# Patient Record
Sex: FEMALE | Race: WHITE | NOT HISPANIC OR LATINO | Employment: STUDENT | URBAN - NONMETROPOLITAN AREA
[De-identification: names, ages, dates, MRNs, and addresses within clinical notes are randomized per-mention and may not be internally consistent; named-entity substitution may affect disease eponyms.]

---
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
## 2019-02-15 RX ORDER — METOPROLOL SUCCINATE 25 MG/1
TABLET, EXTENDED RELEASE ORAL
Qty: 15 TABLET | Refills: 0 | Status: SHIPPED | OUTPATIENT
Start: 2019-02-15 | End: 2019-03-27 | Stop reason: SDUPTHER

## 2019-03-14 RX ORDER — METOPROLOL SUCCINATE 25 MG/1
TABLET, EXTENDED RELEASE ORAL
Qty: 15 TABLET | Refills: 0 | OUTPATIENT
Start: 2019-03-14

## 2019-03-21 RX ORDER — METOPROLOL SUCCINATE 25 MG/1
TABLET, EXTENDED RELEASE ORAL
Qty: 15 TABLET | Refills: 0 | OUTPATIENT
Start: 2019-03-21

## 2019-03-27 RX ORDER — METOPROLOL SUCCINATE 25 MG/1
12.5 TABLET, EXTENDED RELEASE ORAL DAILY
Qty: 15 TABLET | Refills: 0 | Status: SHIPPED | OUTPATIENT
Start: 2019-03-27

## 2019-05-01 ENCOUNTER — TELEPHONE (OUTPATIENT)
Dept: CARDIOLOGY | Facility: CLINIC | Age: 19
End: 2019-05-01

## 2019-05-01 NOTE — TELEPHONE ENCOUNTER
PT HAD APPOINTMENT WITH [facility] ON 5-1-2019. UNABLE TO REACH PT TO CONFIRM APPOINTMENT ON 4-. PT NO SHOW AND UNABLE TO REACH PT TO R/S. WILL ATTEMPT CALL AGAIN

## 2023-10-25 ENCOUNTER — OFFICE VISIT (OUTPATIENT)
Dept: CARDIOLOGY | Facility: CLINIC | Age: 23
End: 2023-10-25
Payer: COMMERCIAL

## 2023-10-25 VITALS
WEIGHT: 231 LBS | OXYGEN SATURATION: 98 % | DIASTOLIC BLOOD PRESSURE: 86 MMHG | BODY MASS INDEX: 42.51 KG/M2 | HEIGHT: 62 IN | HEART RATE: 81 BPM | SYSTOLIC BLOOD PRESSURE: 120 MMHG

## 2023-10-25 DIAGNOSIS — R00.2 PALPITATIONS: Primary | ICD-10-CM

## 2023-10-25 DIAGNOSIS — R06.02 SHORTNESS OF BREATH: ICD-10-CM

## 2023-10-25 DIAGNOSIS — R42 DIZZINESS: ICD-10-CM

## 2023-10-25 DIAGNOSIS — R00.0 TACHYCARDIA: ICD-10-CM

## 2023-10-25 PROCEDURE — 99204 OFFICE O/P NEW MOD 45 MIN: CPT | Performed by: PHYSICIAN ASSISTANT

## 2023-10-25 PROCEDURE — 1159F MED LIST DOCD IN RCRD: CPT | Performed by: PHYSICIAN ASSISTANT

## 2023-10-25 PROCEDURE — 1160F RVW MEDS BY RX/DR IN RCRD: CPT | Performed by: PHYSICIAN ASSISTANT

## 2023-10-25 NOTE — PROGRESS NOTES
Subjective   Shahrzad Wan is a 23 y.o. female     Chief Complaint   Patient presents with    Establish Care     Cardiac eval     Dizziness    Rapid Heart Rate    Palpitations       HPI  The patient is a 23-year-old female who presents to reestablish cardiovascular care.  She was evaluated at least 7 years ago through the clinic for chest pain, dyspnea, and palpitations.  At that time, stress test and echo studies were performed.  Stress and echo studies were unremarkable.  Event monitor was performed as well which indicated sinus rhythm with sinus tachycardia but no significant dysrhythmic activity.  Rare PVCs were noted.  She was lost to follow-up for unknown reasons but presents today to [facility] because of recurrent symptoms as of recent.  She has started noticing increasing episodes of dizziness, typically associated with tachycardia and palpitations otherwise.  She really has had no syncopal episodes but has had intermittent episodes of presyncope.  She has been placed on metoprolol succinate, but she does not feel that this is overly effective.  She feels that the medication wears off during the day.  She otherwise rarely has chest pain.  Dyspnea remains at baseline.  She has no failure symptoms.  She did have an EKG with her primary care provider.  This suggested sinus rhythm with minor nonspecific ST and T wave changes.  There was also time pattern in V1 and V2.  No acute changes were noted.  Labs were also performed where CBC findings were unremarkable.  Chemistry profile was very much benign.  Thyroid studies were unremarkable as well.  With ongoing symptoms, cardiac consultation has been recommended and she presents today in that setting.      Current Outpatient Medications   Medication Sig Dispense Refill    metoprolol tartrate (LOPRESSOR) 25 MG tablet Take 0.5 tablets by mouth 2 (Two) Times a Day. 30 tablet 11     No current facility-administered medications for this visit.       Patient has no  "known allergies.    Past Medical History:   Diagnosis Date    Abnormal ECG 2004       Social History     Socioeconomic History    Marital status:    Tobacco Use    Smoking status: Never   Substance and Sexual Activity    Alcohol use: No    Drug use: No    Sexual activity: Yes     Partners: Male     Birth control/protection: Condom       Family History   Problem Relation Age of Onset    No Known Problems Mother     No Known Problems Father     Heart failure Paternal Grandmother     Hypertension Paternal Grandmother        Review of Systems   Constitutional:  Positive for fatigue. Negative for chills, diaphoresis and fever.   HENT:  Positive for sinus pain.    Eyes:  Positive for visual disturbance (floaters).   Respiratory: Negative.  Negative for apnea, cough, chest tightness, shortness of breath and wheezing.    Cardiovascular:  Positive for chest pain and palpitations. Negative for leg swelling.   Gastrointestinal: Negative.  Negative for abdominal pain and blood in stool.   Endocrine: Negative.  Negative for cold intolerance and heat intolerance.   Genitourinary: Negative.  Negative for hematuria.   Musculoskeletal:  Positive for neck pain. Negative for arthralgias, back pain, myalgias and neck stiffness.   Skin: Negative.  Negative for rash and wound.   Allergic/Immunologic: Negative.  Negative for environmental allergies and food allergies.   Neurological:  Positive for dizziness, weakness and light-headedness. Negative for syncope, numbness and headaches.   Hematological: Negative.  Does not bruise/bleed easily.   Psychiatric/Behavioral:  Positive for sleep disturbance.        Objective     Vitals:    10/25/23 1448   BP: 120/86   BP Location: Left arm   Patient Position: Sitting   Pulse: 81   SpO2: 98%   Weight: 105 kg (231 lb)   Height: 157.5 cm (62\")        /86 (BP Location: Left arm, Patient Position: Sitting)   Pulse 81   Ht 157.5 cm (62\")   Wt 105 kg (231 lb)   SpO2 98%   BMI 42.25 kg/m² "      Lab Results (most recent)       None            Physical Exam  Vitals and nursing note reviewed.   Constitutional:       General: She is not in acute distress.     Appearance: She is well-developed.   HENT:      Head: Normocephalic and atraumatic.   Eyes:      Conjunctiva/sclera: Conjunctivae normal.      Pupils: Pupils are equal, round, and reactive to light.   Neck:      Vascular: No JVD.      Trachea: No tracheal deviation.   Cardiovascular:      Rate and Rhythm: Normal rate and regular rhythm.      Heart sounds: Normal heart sounds.   Pulmonary:      Effort: Pulmonary effort is normal.      Breath sounds: Normal breath sounds.   Abdominal:      General: Bowel sounds are normal. There is no distension.      Palpations: Abdomen is soft. There is no mass.      Tenderness: There is no abdominal tenderness. There is no guarding or rebound.   Musculoskeletal:         General: No tenderness or deformity. Normal range of motion.      Cervical back: Normal range of motion and neck supple.   Skin:     General: Skin is warm and dry.      Coloration: Skin is not pale.      Findings: No erythema or rash.   Neurological:      Mental Status: She is alert and oriented to person, place, and time.   Psychiatric:         Behavior: Behavior normal.         Thought Content: Thought content normal.         Judgment: Judgment normal.       Procedure   Procedures         Assessment & Plan      Diagnosis Plan   1. Palpitations  Adult Transthoracic Echo Complete W/ Cont if Necessary Per Protocol    Holter Monitor - 72 Hour Up To 15 Days      2. Tachycardia  Adult Transthoracic Echo Complete W/ Cont if Necessary Per Protocol    Holter Monitor - 72 Hour Up To 15 Days      3. Shortness of breath  Adult Transthoracic Echo Complete W/ Cont if Necessary Per Protocol    Holter Monitor - 72 Hour Up To 15 Days      4. Dizziness  Adult Transthoracic Echo Complete W/ Cont if Necessary Per Protocol    Holter Monitor - 72 Hour Up To 15 Days         1.  The patient presents into the clinic today to reestablish cardiovascular care.  She is primarily referred because of episodes of dizziness with associated palpitations and tachycardia.  I would like to schedule the patient for Holter monitor to evaluate for rate or rhythm disturbance issues.    2.  We will also schedule for an echocardiogram because of symptoms and clinical scenario as above.    3.  She has been placed on metoprolol succinate.  She feels that symptoms may be improved, but feels that the long-acting therapy does not adequately treat throughout the day.  I would try to change that at this time.  We will change her to metoprolol tartrate at 25 mg twice a day.  We can titrate that as needed.    4.  Recent laboratories and EKG were unremarkable.  We have reviewed this in detail with her.    5.  As soon as we know study results as above we will recommend further.  She will report back symptoms with change in therapy.  Further pending above.                 Electronically signed by:

## 2023-11-02 ENCOUNTER — TELEPHONE (OUTPATIENT)
Dept: CARDIOLOGY | Facility: CLINIC | Age: 23
End: 2023-11-02
Payer: COMMERCIAL

## 2023-11-02 NOTE — TELEPHONE ENCOUNTER
Per bon hanson metoprolol 25mg  bid, start out with 1/2 tablet bid if symptoms persist we might increase to 1 tab bid at a later time.     Rx submitted to pharmacy, patient notified.

## 2023-11-02 NOTE — TELEPHONE ENCOUNTER
----- Message from Genesis Heath MA sent at 10/30/2023  5:12 PM EDT -----  Regarding: FW: Medicine  Contact: [phone]    ----- Message -----  From: Shahrzad Wan  Sent: 10/30/2023   2:07 PM EDT  To: Saúl Melara [facility]  Subject: Medicine                                         I was seen by you on Wednesday the 25th and we talked about getting a prescription changed and I went to my pharmacy and they said they didn’t have any new prescription. I was just wondering if you had it sent it in?

## 2023-11-22 ENCOUNTER — CLINICAL SUPPORT (OUTPATIENT)
Dept: CARDIOLOGY | Facility: CLINIC | Age: 23
End: 2023-11-22
Payer: COMMERCIAL

## 2023-11-22 VITALS
BODY MASS INDEX: 43.06 KG/M2 | HEART RATE: 73 BPM | WEIGHT: 234 LBS | OXYGEN SATURATION: 98 % | DIASTOLIC BLOOD PRESSURE: 80 MMHG | HEIGHT: 62 IN | SYSTOLIC BLOOD PRESSURE: 113 MMHG

## 2023-11-22 NOTE — PROGRESS NOTES
Shahrzad Wan  2000 11/22/2023   ?   No chief complaint on file.     ?   HPI: Patient presented in office today for symptom check due to palpitations. She states that she still feels fluttering, that still remains about the same as last time being seen. She states that she feels as if the medication wears off pretty quick. It looks like last time, kale wanted to increase metoprolol 25 mg bid, and she is currently still taking metoprolol 12.5 mg bid. She denies any chest pain or other symptoms at this time.        ?   ? 1.  The patient presents into the clinic today to reestablish cardiovascular care.  She is primarily referred because of episodes of dizziness with associated palpitations and tachycardia.  I would like to schedule the patient for Holter monitor to evaluate for rate or rhythm disturbance issues.     2.  We will also schedule for an echocardiogram because of symptoms and clinical scenario as above.     3.  She has been placed on metoprolol succinate.  She feels that symptoms may be improved, but feels that the long-acting therapy does not adequately treat throughout the day.  I would try to change that at this time.  We will change her to metoprolol tartrate at 25 mg twice a day.  We can titrate that as needed.     4.  Recent laboratories and EKG were unremarkable.  We have reviewed this in detail with her.     5.  As soon as we know study results as above we will recommend further.  She will report back symptoms with change in therapy.  Further pending above.  ?     Current Outpatient Medications:     metoprolol tartrate (LOPRESSOR) 25 MG tablet, Take 0.5 tablets by mouth 2 (Two) Times a Day., Disp: 30 tablet, Rfl: 11   ?   ?   Patient has no known allergies.         ECG 12 Lead    Date/Time: 11/22/2023 3:14 PM  Performed by: Kale Mello PA    Authorized by: Kale Mello PA           ?   Assessment & Plan  Per EULALIO Matta  1)We will increase metoprolol to 25 mg bid, if BP will  allow.  2)NV in x1 month after being on increased medication dose, for symptom check. Monitor in the meantime.    Patient verbally understands recommendations.    Genesis Heath MA    ?   ?   ?

## 2023-12-06 ENCOUNTER — HOSPITAL ENCOUNTER (OUTPATIENT)
Dept: CARDIOLOGY | Facility: HOSPITAL | Age: 23
Discharge: HOME | End: 2023-12-06
Payer: COMMERCIAL

## 2023-12-06 DIAGNOSIS — R06.02 SHORTNESS OF BREATH: ICD-10-CM

## 2023-12-06 DIAGNOSIS — R00.2 PALPITATIONS: ICD-10-CM

## 2023-12-06 DIAGNOSIS — R42 DIZZINESS: ICD-10-CM

## 2023-12-06 DIAGNOSIS — R00.0 TACHYCARDIA: ICD-10-CM

## 2023-12-06 LAB
BH CV ECHO MEAS - ACS: 2.1 CM
BH CV ECHO MEAS - AO MAX PG: 6.3 MMHG
BH CV ECHO MEAS - AO MEAN PG: 4 MMHG
BH CV ECHO MEAS - AO ROOT DIAM: 3 CM
BH CV ECHO MEAS - AO V2 MAX: 125 CM/SEC
BH CV ECHO MEAS - AO V2 VTI: 22.9 CM
BH CV ECHO MEAS - EDV(CUBED): 134.2 ML
BH CV ECHO MEAS - EDV(MOD-SP4): 91.1 ML
BH CV ECHO MEAS - EF(MOD-SP4): 73.8 %
BH CV ECHO MEAS - ESV(CUBED): 29.4 ML
BH CV ECHO MEAS - ESV(MOD-SP4): 23.9 ML
BH CV ECHO MEAS - FS: 39.7 %
BH CV ECHO MEAS - IVS/LVPW: 0.99 CM
BH CV ECHO MEAS - IVSD: 0.75 CM
BH CV ECHO MEAS - LA DIMENSION: 2.7 CM
BH CV ECHO MEAS - LAT PEAK E' VEL: 15.3 CM/SEC
BH CV ECHO MEAS - LV DIASTOLIC VOL/BSA (35-75): 44.6 CM2
BH CV ECHO MEAS - LV MASS(C)D: 132 GRAMS
BH CV ECHO MEAS - LV SYSTOLIC VOL/BSA (12-30): 11.7 CM2
BH CV ECHO MEAS - LVIDD: 5.1 CM
BH CV ECHO MEAS - LVIDS: 3.1 CM
BH CV ECHO MEAS - LVOT AREA: 3.5 CM2
BH CV ECHO MEAS - LVOT DIAM: 2.1 CM
BH CV ECHO MEAS - LVPWD: 0.76 CM
BH CV ECHO MEAS - MED PEAK E' VEL: 10.1 CM/SEC
BH CV ECHO MEAS - MV A MAX VEL: 71 CM/SEC
BH CV ECHO MEAS - MV E MAX VEL: 110 CM/SEC
BH CV ECHO MEAS - MV E/A: 1.55
BH CV ECHO MEAS - PA ACC TIME: 0.16 SEC
BH CV ECHO MEAS - SI(MOD-SP4): 32.9 ML/M2
BH CV ECHO MEAS - SV(MOD-SP4): 67.2 ML
BH CV ECHO MEAS - TAPSE (>1.6): 2.8 CM
BH CV ECHO MEASUREMENTS AVERAGE E/E' RATIO: 8.66
LEFT ATRIUM VOLUME INDEX: 13.3 ML/M2

## 2023-12-06 PROCEDURE — 93306 TTE W/DOPPLER COMPLETE: CPT

## 2023-12-19 ENCOUNTER — TELEPHONE (OUTPATIENT)
Dept: CARDIOLOGY | Facility: CLINIC | Age: 23
End: 2023-12-19
Payer: COMMERCIAL

## 2023-12-19 NOTE — TELEPHONE ENCOUNTER
Notified pt of TANG Matta recommendations. Pt verbalizes understanding and is aware to continue to monitor BP, heart rate and for any symptoms and any worsening symptoms to go to the ER.      Kale Mello PA Welch, Melissa, LPN  Caller: Unspecified (Today,  8:30 AM)  37.5 mg 1 p.o. twice daily.

## 2023-12-19 NOTE — TELEPHONE ENCOUNTER
Notified pt of her results and TANG Granado, recommendations. Pt states that most of the time symptoms are better but still having moments with the palpitations, dizziness and chest pain and they will come and go.  Pt denies any chest pain currently. I did instruct patient that if she gets any worsening symptoms to go to the ER in the meantime.        Faxed results to pcp      ----- Message from Flor Martell MA sent at 12/18/2023  9:27 AM EST -----    ----- Message -----  From: Kale Mello PA  Sent: 12/16/2023   4:32 PM EST  To: Flor Martell MA    Symptoms mostly occur during sinus tachycardia and PVCs.  Have symptoms improve with medication change?  ----- Message -----  From: Darryl Lara MD  Sent: 12/14/2023   8:20 PM EST  To: TANG Sky      Monitor results    Result Text  1.  Sinus rhythm as the baseline and predominant rhythm throughout the study.  2.  Rare PACs and PVCs were present.  3.  No other arrhythmia, ectopy, or block of significance was noted.  4.  A total of 19 triggers were noted during the study correlating to symptoms.  There were 18 total diary entries as well.  Most symptoms occur during sinus rhythm, frequently sinus tachycardia.  PVCs were frequently sensed as well.         echo results     Pt is aware of results and Kale Mello's recommendations. Pt verbalizes ----- Message from Flor Martell MA sent at 12/18/2023  9:27 AM EST -----    ----- Message -----  From: Kale Mello PA  Sent: 12/16/2023   4:32 PM EST  To: Flor Martell MA    Routine follow-up.  ----- Message -----  From: Darryl Lara MD  Sent: 12/13/2023  10:09 PM EST  To: TANG Sky    Echo results    Result Text  Technically adequate study.     1.  LV size is at the upper limits of normal and global LV systolic function is preserved.  Visually estimated ejection fraction is 50 to 55%.  LV wall thickness, although measured as at the lower limits of normal is actually at the upper  limits of normal to mildly increased.  Number, LV diastolic function and filling pressures are normal.  No focal wall motion abnormalities.  The left atrium is at the upper limits of normal size.  Right heart chambers are grossly normal.  No septal defect or intracavitary mass or thrombus.     2.  Somewhat limited echo and Doppler sampling fails to demonstrate significant morphologic or physiologic valve abnormalities.     3.  No pericardial or great vessel pathology.     4.  RV and PA systolic pressures cannot be calculated.

## 2023-12-19 NOTE — TELEPHONE ENCOUNTER
----- Message from Flor Martell MA sent at 12/18/2023  9:27 AM EST -----    ----- Message -----  From: Kale Mello PA  Sent: 12/16/2023   4:32 PM EST  To: Flor Martell MA    Routine follow-up.  ----- Message -----  From: Darryl Lara MD  Sent: 12/13/2023  10:09 PM EST  To: TANG Sky

## 2024-01-03 ENCOUNTER — CLINICAL SUPPORT (OUTPATIENT)
Dept: CARDIOLOGY | Facility: CLINIC | Age: 24
End: 2024-01-03
Payer: COMMERCIAL

## 2024-01-03 VITALS — OXYGEN SATURATION: 98 % | DIASTOLIC BLOOD PRESSURE: 73 MMHG | HEART RATE: 74 BPM | SYSTOLIC BLOOD PRESSURE: 107 MMHG

## 2024-01-03 DIAGNOSIS — R00.2 PALPITATIONS: Primary | ICD-10-CM

## 2024-01-03 RX ORDER — BISOPROLOL FUMARATE 5 MG/1
TABLET, FILM COATED ORAL
Qty: 30 TABLET | Refills: 1 | Status: SHIPPED | OUTPATIENT
Start: 2024-01-03

## 2024-01-03 NOTE — PROGRESS NOTES
Shahrzad Wan  2000  1/3/2024   ?   Chief Complaint   Patient presents with    Nurse Visit      Symptom check       ?   HPI:   ?   ? patient presented to the office today for a NV she was recently started on Metoprolol tart. 25 mg 1 daily to help with papillations and heart racing it was increased to 1.5 tablets daily due to her feeling increased palpitations and dizziness  with position change she states today she states today she went back down to the 1 tablet daily due to symptoms getting worse on the 1.5 tablet. She states she has had some chest pain at random times also.   ?     Current Outpatient Medications:     bisoprolol (ZEBeta) 5 MG tablet, Start out with 1/2 tablet daily, Disp: 30 tablet, Rfl: 1   ?   ?   Patient has no known allergies.         ECG 12 Lead    Date/Time: 1/3/2024 3:02 PM  Performed by: Kale Mello PA    Authorized by: Kale Mello PA  Comparison: compared with previous ECG from 8/9/2023           ?   Assessment & Plan    ?   ?   ? palpitations     Chart , EKG , Medication reviewed by Kale Mello PAC     Patient is to D/C Metoprolol Tart. 25 mg due to increased side effects.  Patient is to start Bisoprolol 5 mg she will start out with 1/2 tablet ( 2.5 mg ) daily.  Patient is to call in 2-3 weeks with symptom update at that time we may look at increasingly to full 5 mg tablet if she has improvement.   Patient to call with any question or concerns or any new or worsening symptoms after starting new medication.     Patient Verbalized Understanding.     AT [facility]

## 2024-04-30 ENCOUNTER — TELEPHONE (OUTPATIENT)
Dept: CARDIOLOGY | Facility: CLINIC | Age: 24
End: 2024-04-30
Payer: COMMERCIAL

## 2024-04-30 RX ORDER — BISOPROLOL FUMARATE 5 MG/1
5 TABLET, FILM COATED ORAL DAILY
Qty: 30 TABLET | Refills: 6 | Status: SHIPPED | OUTPATIENT
Start: 2024-04-30

## 2024-04-30 RX ORDER — BISOPROLOL FUMARATE 5 MG/1
TABLET, FILM COATED ORAL
Qty: 30 TABLET | Refills: 0 | Status: SHIPPED | OUTPATIENT
Start: 2024-04-30 | End: 2024-04-30 | Stop reason: SDUPTHER

## 2024-04-30 NOTE — TELEPHONE ENCOUNTER
Per Kale Mello PA-C, patient can take full tablet if blood pressure will allow. She does not have BP readings but does not recall it running low. She was advised to monitor BP/HR after taking a full tablet and call with any concerns.

## 2024-04-30 NOTE — TELEPHONE ENCOUNTER
Patient called in said that she has been taking bisoprolol 5 mg 1/2 tab po qd . She said that this was working well at first but that now her heart rate is getting up . She would like to know if she should take a whole tablet daily .

## 2024-07-01 RX ORDER — BISOPROLOL FUMARATE 5 MG/1
TABLET, FILM COATED ORAL
Qty: 30 TABLET | Refills: 0 | Status: SHIPPED | OUTPATIENT
Start: 2024-07-01

## 2024-08-12 RX ORDER — BISOPROLOL FUMARATE 5 MG/1
TABLET, FILM COATED ORAL
Qty: 30 TABLET | Refills: 0 | Status: SHIPPED | OUTPATIENT
Start: 2024-08-12

## 2024-11-01 RX ORDER — BISOPROLOL FUMARATE 5 MG/1
2.5 TABLET, FILM COATED ORAL DAILY
Qty: 30 TABLET | Refills: 0 | Status: SHIPPED | OUTPATIENT
Start: 2024-11-01

## 2024-11-21 ENCOUNTER — OFFICE VISIT (OUTPATIENT)
Dept: CARDIOLOGY | Facility: CLINIC | Age: 24
End: 2024-11-21
Payer: COMMERCIAL

## 2024-11-21 VITALS
HEART RATE: 74 BPM | DIASTOLIC BLOOD PRESSURE: 72 MMHG | SYSTOLIC BLOOD PRESSURE: 116 MMHG | WEIGHT: 247.6 LBS | HEIGHT: 62 IN | OXYGEN SATURATION: 97 % | BODY MASS INDEX: 45.56 KG/M2

## 2024-11-21 DIAGNOSIS — R06.02 SHORTNESS OF BREATH: ICD-10-CM

## 2024-11-21 DIAGNOSIS — R00.0 TACHYCARDIA: ICD-10-CM

## 2024-11-21 DIAGNOSIS — R00.2 PALPITATIONS: Primary | ICD-10-CM

## 2024-11-21 PROCEDURE — 1159F MED LIST DOCD IN RCRD: CPT | Performed by: PHYSICIAN ASSISTANT

## 2024-11-21 PROCEDURE — 1160F RVW MEDS BY RX/DR IN RCRD: CPT | Performed by: PHYSICIAN ASSISTANT

## 2024-11-21 PROCEDURE — 99213 OFFICE O/P EST LOW 20 MIN: CPT | Performed by: PHYSICIAN ASSISTANT

## 2024-11-21 NOTE — PROGRESS NOTES
Problem list     Subjective   Shahrzad Wan is a 24 y.o. female     Chief Complaint   Patient presents with    Palpitations     Routine follow up       HPI  The patient presents in clinic today for follow-up and yearly evaluation.  She has been seen for some time to the clinic for chronic and recurrent chest pain, palpitations, and dyspnea.  Prior stress and echo studies a number of years ago through the clinic had been benign.  She was lost to follow-up but presented back last year because of ongoing symptoms, primarily palpitations and tachycardia.  Echo was performed again indicating normal systolic function with no significant valvular or structural abnormalities.  Monitor indicated sinus rhythm with rare PACs and PVCs.  There is no significant rate or rhythm disturbance issues otherwise.  Of note, symptoms mostly occurred during sinus rhythm but PVCs were sensed.  She was started on metoprolol initially.  This was not felt to be overly effective.  Will start back and changed to bisoprolol.  She has done extraordinarily well with bisoprolol.  She tells me that her palpitations and tachycardic issues have basically resolved on this therapy.  She denies continued chest pain.  Dyspnea is at baseline.  She feels very well at this time and has no further complaints.    Current Outpatient Medications on File Prior to Visit   Medication Sig Dispense Refill    bisoprolol (ZEBeta) 5 MG tablet Take 1/2 (one-half) tablet by mouth once daily 30 tablet 0     No current facility-administered medications on file prior to visit.       Patient has no known allergies.    Past Medical History:   Diagnosis Date    Abnormal ECG 2004       Social History     Socioeconomic History    Marital status:    Tobacco Use    Smoking status: Never   Substance and Sexual Activity    Alcohol use: No    Drug use: No    Sexual activity: Yes     Partners: Male     Birth control/protection: Condom       Family History   Problem Relation Age  "of Onset    No Known Problems Mother     No Known Problems Father     Heart failure Paternal Grandmother     Hypertension Paternal Grandmother        Review of Systems   Constitutional: Negative.  Negative for chills, diaphoresis, fatigue and fever.   HENT: Negative.     Eyes: Negative.    Respiratory:  Positive for shortness of breath (increased activity or exertion). Negative for apnea, cough, chest tightness and wheezing.    Cardiovascular:  Positive for palpitations (improvement with bisoprolol). Negative for chest pain and leg swelling.   Gastrointestinal: Negative.  Negative for abdominal pain, blood in stool, constipation, diarrhea, nausea and vomiting.   Endocrine: Negative.    Genitourinary: Negative.  Negative for hematuria.   Musculoskeletal:  Positive for neck pain. Negative for arthralgias, back pain and myalgias.   Skin: Negative.    Allergic/Immunologic: Negative.    Neurological:  Positive for dizziness (random episodes). Negative for syncope, weakness, light-headedness, numbness and headaches.   Hematological: Negative.  Does not bruise/bleed easily.   Psychiatric/Behavioral: Negative.  Negative for sleep disturbance.        Objective   Vitals:    11/21/24 1400   BP: 116/72   BP Location: Left arm   Patient Position: Sitting   Pulse: 74   SpO2: 97%   Weight: 112 kg (247 lb 9.6 oz)   Height: 157.5 cm (62.01\")      /72 (BP Location: Left arm, Patient Position: Sitting)   Pulse 74   Ht 157.5 cm (62.01\")   Wt 112 kg (247 lb 9.6 oz)   SpO2 97%   BMI 45.27 kg/m²    Lab Results (most recent)       None          Physical Exam  Vitals and nursing note reviewed.   Constitutional:       General: She is not in acute distress.     Appearance: She is well-developed.   HENT:      Head: Normocephalic and atraumatic.   Eyes:      Conjunctiva/sclera: Conjunctivae normal.      Pupils: Pupils are equal, round, and reactive to light.   Neck:      Vascular: No JVD.      Trachea: No tracheal deviation. "   Cardiovascular:      Rate and Rhythm: Normal rate and regular rhythm.      Heart sounds: Normal heart sounds.   Pulmonary:      Effort: Pulmonary effort is normal.      Breath sounds: Normal breath sounds.   Abdominal:      General: Bowel sounds are normal. There is no distension.      Palpations: Abdomen is soft. There is no mass.      Tenderness: There is no abdominal tenderness. There is no guarding or rebound.   Musculoskeletal:         General: No tenderness or deformity. Normal range of motion.      Cervical back: Normal range of motion and neck supple.   Skin:     General: Skin is warm and dry.      Coloration: Skin is not pale.      Findings: No erythema or rash.   Neurological:      Mental Status: She is alert and oriented to person, place, and time.   Psychiatric:         Behavior: Behavior normal.         Thought Content: Thought content normal.         Judgment: Judgment normal.           Procedure   Procedures       Assessment & Plan      Diagnosis Plan   1. Palpitations        2. Tachycardia        3. Shortness of breath          1.  At this time, the patient appears to be doing well.  Prior echo and Holter monitor findings were reviewed in detail with the patient.  Echo findings were largely benign.  Holter indicated sinus rhythm as a baseline and predominant rhythm.  Rare PACs and PVCs were noted.  Symptoms were noted with PVCs.    2.  She was initially treated with metoprolol, but does not feel this as being overly effective.  This was discontinued and she was placed on bisoprolol.  She has responded very favorably to the bisoprolol.  Tachycardic and palpitation type issues have now resolved.  She has no dysrhythmic symptoms otherwise.  With institution of the medications she also tells me that her chest pain and other symptoms have resolved.    3.  With benign workup now complete stability on low-dose beta-blocker therapy, nothing further.  We will continue to see her on a yearly  evaluation.           Shahrzad Wan  reports that she has never smoked. She does not have any smokeless tobacco history on file.            Electronically signed by:

## 2024-12-30 RX ORDER — BISOPROLOL FUMARATE 5 MG/1
2.5 TABLET, FILM COATED ORAL DAILY
Qty: 30 TABLET | Refills: 0 | Status: SHIPPED | OUTPATIENT
Start: 2024-12-30

## 2024-12-30 RX ORDER — BISOPROLOL FUMARATE 5 MG/1
2.5 TABLET, FILM COATED ORAL DAILY
Qty: 30 TABLET | Refills: 0 | Status: SHIPPED | OUTPATIENT
Start: 2024-12-30 | End: 2024-12-30

## 2025-01-21 ENCOUNTER — TELEPHONE (OUTPATIENT)
Dept: CARDIOLOGY | Facility: CLINIC | Age: 25
End: 2025-01-21
Payer: COMMERCIAL

## 2025-01-22 NOTE — TELEPHONE ENCOUNTER
Patient notified    Kale Mello PA Hedrick, Mari-Alice, MA  Caller: Unspecified (Yesterday,  1:28 PM)  Please establish with OB service.  I would await their recommendation on this medication.  From our standpoint, the medication is pregnancy category C.  I would be fine with that as long as her OB service is.

## 2025-05-07 RX ORDER — BISOPROLOL FUMARATE 5 MG/1
2.5 TABLET, FILM COATED ORAL DAILY
Qty: 30 TABLET | Refills: 0 | Status: SHIPPED | OUTPATIENT
Start: 2025-05-07

## 2025-07-01 RX ORDER — BISOPROLOL FUMARATE 5 MG/1
2.5 TABLET, FILM COATED ORAL DAILY
Qty: 45 TABLET | Refills: 3 | Status: SHIPPED | OUTPATIENT
Start: 2025-07-01

## 2025-07-24 ENCOUNTER — TELEPHONE (OUTPATIENT)
Dept: CARDIOLOGY | Facility: CLINIC | Age: 25
End: 2025-07-24

## 2025-07-28 ENCOUNTER — OFFICE VISIT (OUTPATIENT)
Dept: CARDIOLOGY | Facility: CLINIC | Age: 25
End: 2025-07-28
Payer: COMMERCIAL

## 2025-07-28 VITALS
OXYGEN SATURATION: 96 % | HEART RATE: 92 BPM | HEIGHT: 62 IN | BODY MASS INDEX: 38.64 KG/M2 | DIASTOLIC BLOOD PRESSURE: 74 MMHG | WEIGHT: 210 LBS | SYSTOLIC BLOOD PRESSURE: 108 MMHG

## 2025-07-28 DIAGNOSIS — R00.2 PALPITATIONS: Primary | ICD-10-CM

## 2025-07-28 DIAGNOSIS — R00.0 TACHYCARDIA: ICD-10-CM

## 2025-07-28 DIAGNOSIS — R06.02 SHORTNESS OF BREATH: ICD-10-CM

## 2025-07-28 DIAGNOSIS — R42 DIZZINESS: ICD-10-CM

## 2025-07-28 PROCEDURE — 99214 OFFICE O/P EST MOD 30 MIN: CPT | Performed by: PHYSICIAN ASSISTANT

## 2025-07-28 PROCEDURE — 1159F MED LIST DOCD IN RCRD: CPT | Performed by: PHYSICIAN ASSISTANT

## 2025-07-28 PROCEDURE — 93000 ELECTROCARDIOGRAM COMPLETE: CPT | Performed by: PHYSICIAN ASSISTANT

## 2025-07-28 PROCEDURE — 1160F RVW MEDS BY RX/DR IN RCRD: CPT | Performed by: PHYSICIAN ASSISTANT

## 2025-07-28 NOTE — PROGRESS NOTES
Problem list     Subjective   Shahrzad Wan is a 24 y.o. female     Chief Complaint   Patient presents with    Follow-up     Per PCP request for tachycardia and palpitations        HPI  The patient presents in the clinic today for follow-up.  We have seen her because of chronic palpitations and tachycardia.  She has also had chronic chest pain.  She had an evaluation with stress and echo studies previously.  Stress and echo studies have been benign, dating back to 2022 2023.  Monitor had suggested sinus rhythm with rare PACs and PVCs.  There was no significant rate or rhythm disturbance issues otherwise.  She initially was treated with metoprolol.  She felt that this was minimally effective.  That was discontinued in favor of bisoprolol.  She had responded exceptionally well to that.  She is referred back by her OB/GYN service because of increasing tachycardia and palpitations recently.  She has increasing positional dizziness.  Fatigue is significant.  She is very concerned with increasing palpitations and tachycardia and has requested repeat evaluation.  She reports that until pregnancy, bisoprolol had done an excellent job in minimizing symptoms.  She has no syncope.  She has no further complaints.    Current Outpatient Medications on File Prior to Visit   Medication Sig Dispense Refill    bisoprolol (ZEBeta) 5 MG tablet Take 1/2 (one-half) tablet by mouth once daily 45 tablet 3    Prenatal Vit-Fe Fumarate-FA (PRENATAL VITAMINS PO) Take  by mouth. daily       No current facility-administered medications on file prior to visit.       Patient has no known allergies.    Past Medical History:   Diagnosis Date    Abnormal ECG 2004       Social History     Socioeconomic History    Marital status:    Tobacco Use    Smoking status: Never    Smokeless tobacco: Never   Vaping Use    Vaping status: Never Used   Substance and Sexual Activity    Alcohol use: Never    Drug use: Never    Sexual activity: Yes      "Partners: Male     Birth control/protection: Condom       Family History   Problem Relation Age of Onset    No Known Problems Mother     No Known Problems Father     Heart failure Paternal Grandmother     Hypertension Paternal Grandmother        Review of Systems   Constitutional:  Positive for fatigue. Negative for chills, diaphoresis and fever.   HENT: Negative.  Negative for hearing loss.    Eyes: Negative.  Negative for visual disturbance.   Respiratory:  Positive for shortness of breath. Negative for apnea, cough, chest tightness and wheezing.    Cardiovascular:  Positive for palpitations. Negative for chest pain and leg swelling.   Gastrointestinal: Negative.  Negative for abdominal pain and blood in stool.   Endocrine: Negative.    Genitourinary: Negative.  Negative for hematuria.   Musculoskeletal: Negative.  Negative for arthralgias, back pain, myalgias, neck pain and neck stiffness.   Skin: Negative.  Negative for rash and wound.   Allergic/Immunologic: Negative.  Negative for environmental allergies and food allergies.   Neurological:  Positive for dizziness. Negative for syncope, weakness, light-headedness, numbness and headaches.   Hematological: Negative.  Does not bruise/bleed easily.   Psychiatric/Behavioral: Negative.  Negative for sleep disturbance.        Objective   Vitals:    07/28/25 0909   BP: 108/74   Pulse: 92   SpO2: 96%   Weight: 95.3 kg (210 lb)   Height: 157.5 cm (62\")      /74   Pulse 92   Ht 157.5 cm (62\")   Wt 95.3 kg (210 lb)   SpO2 96%   BMI 38.41 kg/m²    Lab Results (most recent)       None          Physical Exam  Vitals and nursing note reviewed.   Constitutional:       General: She is not in acute distress.     Appearance: She is well-developed.   HENT:      Head: Normocephalic and atraumatic.   Eyes:      Conjunctiva/sclera: Conjunctivae normal.      Pupils: Pupils are equal, round, and reactive to light.   Neck:      Vascular: No JVD.      Trachea: No tracheal " deviation.   Cardiovascular:      Rate and Rhythm: Normal rate and regular rhythm.      Heart sounds: Normal heart sounds.   Pulmonary:      Effort: Pulmonary effort is normal.      Breath sounds: Normal breath sounds.   Abdominal:      General: Bowel sounds are normal. There is no distension.      Palpations: Abdomen is soft. There is no mass.      Tenderness: There is no abdominal tenderness. There is no guarding or rebound.   Musculoskeletal:         General: No tenderness or deformity. Normal range of motion.      Cervical back: Normal range of motion and neck supple.   Skin:     General: Skin is warm and dry.      Coloration: Skin is not pale.      Findings: No erythema or rash.   Neurological:      Mental Status: She is alert and oriented to person, place, and time.   Psychiatric:         Behavior: Behavior normal.         Thought Content: Thought content normal.         Judgment: Judgment normal.           Procedure     ECG 12 Lead    Date/Time: 7/28/2025 9:12 AM  Performed by: Kale Mello PA    Authorized by: Kale Mello PA  Comparison: compared with previous ECG from 1/3/2024  Comparison to previous ECG: Sinus rhythm, rate 93, normal axis, minor nonspecific ST and T wave changes, no acute changes noted.             Assessment & Plan      Diagnosis Plan   1. Palpitations  Holter Monitor - 72 Hour Up To 15 Days    Adult Transthoracic Echo Complete W/ Cont if Necessary Per Protocol      2. Tachycardia  Holter Monitor - 72 Hour Up To 15 Days    Adult Transthoracic Echo Complete W/ Cont if Necessary Per Protocol      3. Shortness of breath  Holter Monitor - 72 Hour Up To 15 Days    Adult Transthoracic Echo Complete W/ Cont if Necessary Per Protocol      4. Dizziness  Holter Monitor - 72 Hour Up To 15 Days    Adult Transthoracic Echo Complete W/ Cont if Necessary Per Protocol        1.  At this time, the patient presents back because of increasing palpitations and tachycardia.  She is 32 weeks  pregnant which may be contributing.  Still, historically bisoprolol has been very effective at treating dysrhythmic symptoms.  I would continue that for now, as was cleared by her OB/GYN service.    2.  Because of change in clinical scenario, we will schedule for 72-hour Holter to reevaluate for rate or rhythm disturbance issues.  I want a get an echo as well to ensure no change in cardiac structure.  We can specifically evaluate systolic function.    3.  No further adjustments in medications will be made for now unless symptoms should worsen or we see something concerning on testing.  For worsening symptoms she will return.  Further pending above.           Shahrzad Wan  reports that she has never smoked. She has never used smokeless tobacco.         Electronically signed by:

## 2025-08-08 ENCOUNTER — HOSPITAL ENCOUNTER (OUTPATIENT)
Dept: CARDIOLOGY | Facility: HOSPITAL | Age: 25
Discharge: HOME | End: 2025-08-08
Payer: COMMERCIAL

## 2025-08-08 VITALS — BODY MASS INDEX: 38.66 KG/M2 | WEIGHT: 210.1 LBS | HEIGHT: 62 IN

## 2025-08-08 DIAGNOSIS — R00.2 PALPITATIONS: ICD-10-CM

## 2025-08-08 DIAGNOSIS — R00.0 TACHYCARDIA: ICD-10-CM

## 2025-08-08 DIAGNOSIS — R06.02 SHORTNESS OF BREATH: ICD-10-CM

## 2025-08-08 DIAGNOSIS — R42 DIZZINESS: ICD-10-CM

## 2025-08-08 LAB
AORTIC DIMENSIONLESS INDEX: 0.74 (DI)
AV MEAN PRESS GRAD SYS DOP V1V2: 2.6 MMHG
AV VMAX SYS DOP: 104.3 CM/SEC
BH CV ECHO MEAS - 2D AUTO EF SIEMENS: 60 %
BH CV ECHO MEAS - ACS: 1.9 CM
BH CV ECHO MEAS - AO MAX PG: 4.4 MMHG
BH CV ECHO MEAS - AO ROOT DIAM: 3 CM
BH CV ECHO MEAS - AO V2 VTI: 22.5 CM
BH CV ECHO MEAS - EDV(CUBED): 95.6 ML
BH CV ECHO MEAS - ESV(CUBED): 36.8 ML
BH CV ECHO MEAS - FS: 27.2 %
BH CV ECHO MEAS - IVS/LVPW: 0.91 CM
BH CV ECHO MEAS - IVSD: 0.88 CM
BH CV ECHO MEAS - LA DIMENSION: 3.3 CM
BH CV ECHO MEAS - LAT PEAK E' VEL: 10.6 CM/SEC
BH CV ECHO MEAS - LV MASS(C)D: 139.8 GRAMS
BH CV ECHO MEAS - LV MAX PG: 2.9 MMHG
BH CV ECHO MEAS - LV MEAN PG: 1.61 MMHG
BH CV ECHO MEAS - LV V1 MAX: 84.8 CM/SEC
BH CV ECHO MEAS - LV V1 VTI: 16.8 CM
BH CV ECHO MEAS - LVIDD: 4.6 CM
BH CV ECHO MEAS - LVIDS: 3.3 CM
BH CV ECHO MEAS - LVPWD: 0.96 CM
BH CV ECHO MEAS - MED PEAK E' VEL: 8 CM/SEC
BH CV ECHO MEAS - MV A MAX VEL: 42.7 CM/SEC
BH CV ECHO MEAS - MV DEC SLOPE: 403 CM/SEC2
BH CV ECHO MEAS - MV DEC TIME: 0.2 SEC
BH CV ECHO MEAS - MV E MAX VEL: 82.9 CM/SEC
BH CV ECHO MEAS - MV E/A: 1.94
BH CV ECHO MEAS - MV MAX PG: 2.39 MMHG
BH CV ECHO MEAS - MV MEAN PG: 1.26 MMHG
BH CV ECHO MEAS - MV P1/2T: 60.4 MSEC
BH CV ECHO MEAS - MV V2 VTI: 17.4 CM
BH CV ECHO MEAS - MVA(P1/2T): 3.6 CM2
BH CV ECHO MEAS - PA V2 MAX: 89.8 CM/SEC
BH CV ECHO MEAS - PI END-D VEL: 90.8 CM/SEC
BH CV ECHO MEAS - RV MAX PG: 2.44 MMHG
BH CV ECHO MEAS - RV V1 MAX: 78.1 CM/SEC
BH CV ECHO MEAS - RV V1 VTI: 15.7 CM
BH CV ECHO MEAS - RVDD: 2.5 CM
BH CV ECHO MEAS - TAPSE (>1.6): 2.31 CM
BH CV ECHO MEASUREMENTS AVERAGE E/E' RATIO: 8.91
BH CV XLRA - TDI S': 11.9 CM/SEC
LV EF 2D ECHO EST: 53 %
LV EF 3D SEGMENTATION: 54 %
SINUS: 2.9 CM

## 2025-08-08 PROCEDURE — 93306 TTE W/DOPPLER COMPLETE: CPT

## 2025-08-25 ENCOUNTER — TELEPHONE (OUTPATIENT)
Dept: CARDIOLOGY | Facility: CLINIC | Age: 25
End: 2025-08-25
Payer: COMMERCIAL